# Patient Record
(demographics unavailable — no encounter records)

---

## 2025-07-10 NOTE — DISCUSSION/SUMMARY
[de-identified] : The patient presents with a fracture of the left fifth metatarsal.  We are going to put her in a short CAM boot.  She will come back in two weeks to reassess with an x-ray and possibly move her into a sneaker.  She is advised to ice and elevate.

## 2025-07-10 NOTE — PHYSICAL EXAM
[de-identified] : Right Foot: Foot: Range of Motion in Degrees: 	                                Claimant:	                Normal:	 Dorsiflexion (Active)	40-degrees	40-degrees	 Dorsiflexion (Passive)	40-degrees	40-degrees	 Plantar (Ankle)	                40-degrees	40-degrees	 Plantar (Passive)	                40-degrees	40-degrees	 Inversion (Active)	                30-degrees	30-degrees	 Inversion (Passive)   	30-degrees	30-degrees	 Eversion  (Active)	                20-degrees	20-degrees	 Eversion (Passive) 	                20-degrees	20-degrees	 No instability.  No tenderness over the anterolateral ligaments.  No medial-sided tenderness.  No hind, mid or forefoot tenderness.  No proximal fibula tenderness.  Neurologically and vascularly intact distally.  Normal dorsi, plantar flexion, inversion of the foot.  No motor, sensory, reflex or vascular deficits.  2+ DP and PT pulses.  Skin is intact.  No rashes, scars or lesions.   Left Foot: Range of motion not assessed secondary to pain.  She has ecchymosis throughout the toe.  She is tender throughout the fifth metatarsal. Neurovascular and neurologic exams within normal limits.   [de-identified] : Radiographs, three views of the left foot taken in the office today, reveal a nondisplaced fifth metatarsal fracture.

## 2025-07-10 NOTE — HISTORY OF PRESENT ILLNESS
[de-identified] : The patient comes in today stating she stubbed her toe when she was in [place] and now she has pain.  She went to an urgent care center and comes in today for evaluation. She is in flipflops. She was walking pretty well. She states she is at home with her three little kids.  The patient states the onset/injury occurred 07/04/2025. The patient states the pain is constant and radiating.  The patient describes the pain as sharp, achy and throbbing. [6] : a current pain level of 6/10 [de-identified] : walking [de-identified] : rest

## 2025-07-10 NOTE — ADDENDUM
[FreeTextEntry1] : This note was written by Marylin Cummings on 07/10/2025 acting as scribe for Destiny Kline, OTR/L, PA

## 2025-07-23 NOTE — ADDENDUM
[FreeTextEntry1] : This note was written by Coleen Boggs on 07/23/2025 acting as scribe for Destiny Kline OTR/DANIELITO, PA.

## 2025-07-23 NOTE — PHYSICAL EXAM
[de-identified] : Left foot: She has minimal swelling to the foot.  She is tender along the fifth metatarsal.  Range of motion not assessed secondary to fracture. [de-identified] : Gait: Slightly antalgic to antalgic.  Station: Normal  [de-identified] : Appearance: Well-developed, well-nourished female  in no acute distress.  [de-identified] : X-rays taken in the office today, three views of the left foot, reveal adequate alignment of the fracture.  No new fractures or dislocations noted.

## 2025-07-23 NOTE — DISCUSSION/SUMMARY
[de-identified] : The patient presents status post left fifth metatarsal fracture. The patient is going to keep the boot on for three weeks and return back to the office in a period of three weeks.

## 2025-07-23 NOTE — HISTORY OF PRESENT ILLNESS
[de-identified] : Cortney comes in today with a left fifth metatarsal fracture.  She has been wearing the boot on and off.  She states she is feeling much better.